# Patient Record
Sex: FEMALE | Race: WHITE | Employment: STUDENT | URBAN - METROPOLITAN AREA
[De-identification: names, ages, dates, MRNs, and addresses within clinical notes are randomized per-mention and may not be internally consistent; named-entity substitution may affect disease eponyms.]

---

## 2017-01-21 ENCOUNTER — OFFICE VISIT (OUTPATIENT)
Dept: FAMILY MEDICINE CLINIC | Facility: CLINIC | Age: 22
End: 2017-01-21

## 2017-01-21 VITALS
HEART RATE: 61 BPM | HEIGHT: 63 IN | RESPIRATION RATE: 16 BRPM | WEIGHT: 135 LBS | OXYGEN SATURATION: 99 % | BODY MASS INDEX: 23.92 KG/M2

## 2017-01-21 DIAGNOSIS — J02.9 SORE THROAT: Primary | ICD-10-CM

## 2017-01-21 LAB
CONTROL LINE PRESENT WITH A CLEAR BACKGROUND (YES/NO): YES YES/NO
CONTROL LINE PRESENT WITH A CLEAR BACKGROUND (YES/NO): YES YES/NO
MONONUCLEOSIS TEST, QUAL: NEGATIVE
STREP GRP A CUL-SCR: NEGATIVE

## 2017-01-21 PROCEDURE — 87880 STREP A ASSAY W/OPTIC: CPT | Performed by: PHYSICIAN ASSISTANT

## 2017-01-21 PROCEDURE — 86308 HETEROPHILE ANTIBODY SCREEN: CPT | Performed by: PHYSICIAN ASSISTANT

## 2017-01-21 PROCEDURE — 99213 OFFICE O/P EST LOW 20 MIN: CPT | Performed by: PHYSICIAN ASSISTANT

## 2017-01-21 PROCEDURE — 87081 CULTURE SCREEN ONLY: CPT | Performed by: PHYSICIAN ASSISTANT

## 2017-01-21 RX ORDER — CODEINE PHOSPHATE AND GUAIFENESIN 10; 100 MG/5ML; MG/5ML
10 SOLUTION ORAL EVERY 6 HOURS PRN
Qty: 120 ML | Refills: 0 | Status: SHIPPED | OUTPATIENT
Start: 2017-01-21 | End: 2017-01-31

## 2017-01-21 RX ORDER — FLUTICASONE PROPIONATE 50 MCG
2 SPRAY, SUSPENSION (ML) NASAL DAILY
Qty: 16 G | Refills: 0 | Status: SHIPPED | OUTPATIENT
Start: 2017-01-21 | End: 2017-01-26

## 2017-01-21 NOTE — PROGRESS NOTES
CHIEF COMPLAINT:   Patient presents with:  Sore Throat: s/s for 4 days  Ear Pain: Left/Right ears        HPI:   Minus Neil is a 24year old female presents to clinic with complaint of sore throat with radiation to bilateral ears.  Patient has had for 4 GENERAL: well developed, well nourished,in no apparent distress. Non toxic.    SKIN: no rashes,no suspicious lesions  HEAD: atraumatic, normocephalic  EYES: conjunctiva clear, EOM intact  EARS: TM's clear, non-injected, no bulging, retraction, or fluid  NOS Comfort measures explained and discussed as listed in Patient Instructions  Ibuprofen q 6 hours for pain  Chloraseptic spray  Oral Lozenges  Cheratussin at night for cough. Flonase for PND 2 sprays each nostril daily.     Follow up in 3-5 days if not impr · Over-the-counter cold medicines will not shorten the length of time you’re sick, but they may be helpful for the following symptoms: cough, sore throat, and nasal and sinus congestion.  (Note: Do not use decongestants if you have high blood pressure.)  Fo The tonsils and pharynx can become inflamed due to a cold or flu virus. Postnasal drip (excess mucus draining from the nasal cavity) can irritate the throat. It can also make the throat or tonsils more likely to be infected by bacteria.  Severe, untreated t Treatment depends on many factors. What is the likely cause? Is the problem recent? Does it keep coming back? In many cases, the best thing to do is to treat the symptoms, rest, and let the problem heal itself.  Antibiotics may help clear up some bacterial In some cases, tonsils need to be removed. This is often done as outpatient (same-day) surgery. Your healthcare provider may advise removing the tonsils in cases of:  · Several severe bouts of tonsillitis in a year.  “Severe” episodes include those that radha

## 2017-01-23 ENCOUNTER — OFFICE VISIT (OUTPATIENT)
Dept: FAMILY MEDICINE CLINIC | Facility: CLINIC | Age: 22
End: 2017-01-23

## 2017-01-23 VITALS
TEMPERATURE: 98 F | BODY MASS INDEX: 23.57 KG/M2 | RESPIRATION RATE: 16 BRPM | HEIGHT: 63 IN | DIASTOLIC BLOOD PRESSURE: 70 MMHG | HEART RATE: 104 BPM | SYSTOLIC BLOOD PRESSURE: 108 MMHG | WEIGHT: 133 LBS

## 2017-01-23 DIAGNOSIS — J02.9 SORE THROAT: Primary | ICD-10-CM

## 2017-01-23 DIAGNOSIS — J03.90 TONSILLITIS: ICD-10-CM

## 2017-01-23 LAB — CONTROL LINE PRESENT WITH A CLEAR BACKGROUND (YES/NO): YES YES/NO

## 2017-01-23 PROCEDURE — 87880 STREP A ASSAY W/OPTIC: CPT | Performed by: FAMILY MEDICINE

## 2017-01-23 PROCEDURE — 99213 OFFICE O/P EST LOW 20 MIN: CPT | Performed by: FAMILY MEDICINE

## 2017-01-23 RX ORDER — AMOXICILLIN AND CLAVULANATE POTASSIUM 875; 125 MG/1; MG/1
1 TABLET, FILM COATED ORAL 2 TIMES DAILY
Qty: 20 TABLET | Refills: 0 | Status: SHIPPED | OUTPATIENT
Start: 2017-01-23 | End: 2017-02-02

## 2017-01-26 ENCOUNTER — OFFICE VISIT (OUTPATIENT)
Dept: FAMILY MEDICINE CLINIC | Facility: CLINIC | Age: 22
End: 2017-01-26

## 2017-01-26 VITALS
DIASTOLIC BLOOD PRESSURE: 74 MMHG | HEIGHT: 63 IN | SYSTOLIC BLOOD PRESSURE: 118 MMHG | HEART RATE: 68 BPM | RESPIRATION RATE: 16 BRPM | BODY MASS INDEX: 23.04 KG/M2 | WEIGHT: 130 LBS | TEMPERATURE: 98 F

## 2017-01-26 DIAGNOSIS — J02.9 SORE THROAT: Primary | ICD-10-CM

## 2017-01-26 DIAGNOSIS — J03.90 TONSILLITIS: ICD-10-CM

## 2017-01-26 PROCEDURE — 99213 OFFICE O/P EST LOW 20 MIN: CPT | Performed by: PHYSICIAN ASSISTANT

## 2017-01-26 RX ORDER — METHYLPREDNISOLONE 4 MG/1
TABLET ORAL
Qty: 1 KIT | Refills: 0 | Status: SHIPPED | OUTPATIENT
Start: 2017-01-26 | End: 2017-05-23 | Stop reason: ALTCHOICE

## 2017-01-26 NOTE — PROGRESS NOTES
HPI:   Kevin Tyler is a 24year old female who presents for sore throat for  1  weeks. Patient was seen at the [facility] 1/21 and at our clinic with YP 1/23. Patient had negative strep and mono at the [facility]; also had negative throat culture.  Was prescribed flona distress  SKIN: warm & dry, no rash  EYES:PERRLA, conjunctiva are clear  HEENT: atraumatic, normocephalic, TMs clear and normal, nares patent, posterior pharynx with mild erythema, cobblestoning and PND, tonsils are 2+, uvula midline, no trismus, no drooli

## 2017-02-08 NOTE — PROGRESS NOTES
HPI:    Patient ID: Luis Magdaleno is a 24year old female. Sore Throat   This is a new problem. Episode onset: 1 wk before visit. The problem has been gradually worsening. There has been no fever. The pain is moderate.  Associated symptoms include swolle diagnosis)  Tonsillitis   Strep test negative. Worsening sore throat. With large exudative tonsills. + chills  Follow up if worse or not resolving in a few days. 1. Sore throat  - Rapid Strep  - Amoxicillin-Pot Clavulanate 875-125 MG Oral Tab;  Take 1 t

## 2017-05-23 ENCOUNTER — OFFICE VISIT (OUTPATIENT)
Dept: FAMILY MEDICINE CLINIC | Facility: CLINIC | Age: 22
End: 2017-05-23

## 2017-05-23 VITALS
BODY MASS INDEX: 23.39 KG/M2 | TEMPERATURE: 99 F | SYSTOLIC BLOOD PRESSURE: 106 MMHG | RESPIRATION RATE: 20 BRPM | HEIGHT: 63 IN | WEIGHT: 132 LBS | HEART RATE: 63 BPM | DIASTOLIC BLOOD PRESSURE: 70 MMHG | OXYGEN SATURATION: 98 %

## 2017-05-23 DIAGNOSIS — R10.12 LEFT UPPER QUADRANT PAIN: Primary | ICD-10-CM

## 2017-05-23 PROCEDURE — 87086 URINE CULTURE/COLONY COUNT: CPT | Performed by: INTERNAL MEDICINE

## 2017-05-23 PROCEDURE — 81003 URINALYSIS AUTO W/O SCOPE: CPT | Performed by: INTERNAL MEDICINE

## 2017-05-23 PROCEDURE — 99214 OFFICE O/P EST MOD 30 MIN: CPT | Performed by: INTERNAL MEDICINE

## 2017-05-23 RX ORDER — SUCRALFATE 1 G/1
1 TABLET ORAL
Qty: 40 TABLET | Refills: 0 | Status: SHIPPED | OUTPATIENT
Start: 2017-05-23 | End: 2017-06-02

## 2017-05-23 NOTE — PATIENT INSTRUCTIONS
LAB HOURS & LOCATIONS        [facility])    [address] [facility]   [address] [place] [address]     ([address])         [address] [place] [address] Ave    Mon-Fri  5am-8pm     Mon-Fri   7am-4pm  Sat         6am-3pm     Sat          7am-3pm      Laverne Herron

## 2017-05-23 NOTE — PROGRESS NOTES
Nettie Stallworth is a 24year old female who presents with abdominal pain. Pain is located at LUQ. Pain is described as sharp. Severity is moderate, constant. Associated symptoms: nausea. The pain radiates to back and shoulders.   Has had for 2  weeks, but congestion, throat clear   NECK: supple,no adenopathy  LUNGS: clear to auscultation, easy breathing  CV: S1 S2, RRR without murmur  GI: active bowel sounds; no masses; no HSM; LUQ tenderness; no rebound or rigidity; no guarding; no hernia on exam  EXT: no

## 2017-05-24 ENCOUNTER — LAB ENCOUNTER (OUTPATIENT)
Dept: LAB | Age: 22
End: 2017-05-24
Attending: INTERNAL MEDICINE
Payer: COMMERCIAL

## 2017-05-24 DIAGNOSIS — R10.12 ABDOMINAL PAIN, LEFT UPPER QUADRANT: Primary | ICD-10-CM

## 2017-05-24 PROCEDURE — 80053 COMPREHEN METABOLIC PANEL: CPT | Performed by: INTERNAL MEDICINE

## 2017-05-24 PROCEDURE — 36415 COLL VENOUS BLD VENIPUNCTURE: CPT | Performed by: INTERNAL MEDICINE

## 2017-05-24 PROCEDURE — 85025 COMPLETE CBC W/AUTO DIFF WBC: CPT | Performed by: INTERNAL MEDICINE

## 2017-06-01 ENCOUNTER — OFFICE VISIT (OUTPATIENT)
Dept: FAMILY MEDICINE CLINIC | Facility: CLINIC | Age: 22
End: 2017-06-01

## 2017-06-01 ENCOUNTER — HOSPITAL ENCOUNTER (OUTPATIENT)
Dept: ULTRASOUND IMAGING | Age: 22
Discharge: HOME | End: 2017-06-01
Attending: INTERNAL MEDICINE
Payer: COMMERCIAL

## 2017-06-01 ENCOUNTER — APPOINTMENT (OUTPATIENT)
Dept: ULTRASOUND IMAGING | Age: 22
End: 2017-06-01
Attending: INTERNAL MEDICINE
Payer: COMMERCIAL

## 2017-06-01 VITALS
SYSTOLIC BLOOD PRESSURE: 98 MMHG | TEMPERATURE: 98 F | HEART RATE: 69 BPM | HEIGHT: 63 IN | BODY MASS INDEX: 23.39 KG/M2 | WEIGHT: 132 LBS | RESPIRATION RATE: 18 BRPM | DIASTOLIC BLOOD PRESSURE: 64 MMHG | OXYGEN SATURATION: 98 %

## 2017-06-01 DIAGNOSIS — M79.661 RIGHT CALF PAIN: ICD-10-CM

## 2017-06-01 DIAGNOSIS — R10.12 LUQ ABDOMINAL PAIN: Primary | ICD-10-CM

## 2017-06-01 PROCEDURE — 93971 EXTREMITY STUDY: CPT | Performed by: INTERNAL MEDICINE

## 2017-06-01 PROCEDURE — 99214 OFFICE O/P EST MOD 30 MIN: CPT | Performed by: INTERNAL MEDICINE

## 2017-06-01 NOTE — PROGRESS NOTES
Kimberly Stewart is a 24year old female. HPI:   Here for follow up LUQ abdominal pain, radiates to her back. No appetite. Treated 2 weeks ago with Carafate, pt states the first few days it seemed to help but then the pain returned.  Occurs daily, intermitten tenderness with palpation  MS: right calf without edema or erythema; mild tenderness with palpation  EXT: no cyanosis, clubbing or edema    ASSESSMENT AND PLAN:   LUQ abdominal pain  (primary encounter diagnosis)- stat US, referral to GI.  Continue carafate

## 2017-10-20 ENCOUNTER — OFFICE VISIT (OUTPATIENT)
Dept: FAMILY MEDICINE CLINIC | Facility: CLINIC | Age: 22
End: 2017-10-20

## 2017-10-20 VITALS
BODY MASS INDEX: 22.68 KG/M2 | HEART RATE: 60 BPM | WEIGHT: 128 LBS | DIASTOLIC BLOOD PRESSURE: 72 MMHG | SYSTOLIC BLOOD PRESSURE: 118 MMHG | HEIGHT: 63 IN | RESPIRATION RATE: 16 BRPM | TEMPERATURE: 98 F

## 2017-10-20 DIAGNOSIS — Z00.00 ROUTINE MEDICAL EXAM: Primary | ICD-10-CM

## 2017-10-20 PROCEDURE — 99395 PREV VISIT EST AGE 18-39: CPT | Performed by: PHYSICIAN ASSISTANT

## 2017-10-20 RX ORDER — TOPIRAMATE 25 MG/1
TABLET ORAL
Qty: 90 TABLET | Refills: 0 | COMMUNITY
Start: 2017-10-20 | End: 2020-01-27

## 2017-10-20 RX ORDER — OXCARBAZEPINE 300 MG/1
375 TABLET, FILM COATED ORAL 2 TIMES DAILY
Refills: 1 | COMMUNITY
Start: 2017-10-17 | End: 2018-12-17

## 2017-10-20 NOTE — PROGRESS NOTES
HPI:   Kait Giron is a 24year old female who presents for a complete physical exam. Symptoms: denies discharge, itching, burning or dysuria, periods are regular. Patient complains of needing physical. Will be seeing gynecology in 2 weeks.  Pt has form t Cigarettes  Smokeless tobacco: Never Used                      Comment: quit x 2 months   Alcohol use: No              Occ: works at [email or web address] in [employer]. : no. Children: no.   Exercise:  twice per week.   Diet: watches fats closely     REVIEW medical exam  Pt allergic to flu vaccine. Form completed and returned. Has appointment with gynecology in 2 weeks for well woman exam.  Continue follow up with neurology and psychiatry as recommended.      Discussed diet, exercise,calcium, vitamin D, fis

## 2017-11-10 ENCOUNTER — HOSPITAL ENCOUNTER (OUTPATIENT)
Age: 22
Discharge: HOME | End: 2017-11-10
Attending: EMERGENCY MEDICINE
Payer: COMMERCIAL

## 2017-11-10 VITALS
DIASTOLIC BLOOD PRESSURE: 67 MMHG | OXYGEN SATURATION: 99 % | SYSTOLIC BLOOD PRESSURE: 113 MMHG | RESPIRATION RATE: 16 BRPM | TEMPERATURE: 98 F | HEART RATE: 67 BPM

## 2017-11-10 DIAGNOSIS — S16.1XXA STRAIN OF NECK MUSCLE, INITIAL ENCOUNTER: Primary | ICD-10-CM

## 2017-11-10 PROCEDURE — 99213 OFFICE O/P EST LOW 20 MIN: CPT

## 2017-11-10 PROCEDURE — 99214 OFFICE O/P EST MOD 30 MIN: CPT

## 2017-11-10 RX ORDER — CYCLOBENZAPRINE HCL 10 MG
10 TABLET ORAL 3 TIMES DAILY PRN
Qty: 20 TABLET | Refills: 0 | Status: SHIPPED | OUTPATIENT
Start: 2017-11-10 | End: 2017-11-17
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
## 2018-01-31 ENCOUNTER — OFFICE VISIT (OUTPATIENT)
Dept: FAMILY MEDICINE CLINIC | Facility: CLINIC | Age: 23
End: 2018-01-31

## 2018-01-31 VITALS
HEIGHT: 63 IN | RESPIRATION RATE: 18 BRPM | BODY MASS INDEX: 22.68 KG/M2 | OXYGEN SATURATION: 98 % | DIASTOLIC BLOOD PRESSURE: 60 MMHG | HEART RATE: 81 BPM | WEIGHT: 128 LBS | SYSTOLIC BLOOD PRESSURE: 100 MMHG | TEMPERATURE: 98 F

## 2018-01-31 DIAGNOSIS — K59.03 DRUG-INDUCED CONSTIPATION: Primary | ICD-10-CM

## 2018-01-31 PROCEDURE — 99213 OFFICE O/P EST LOW 20 MIN: CPT | Performed by: FAMILY MEDICINE

## 2018-01-31 RX ORDER — DULOXETIN HYDROCHLORIDE 20 MG/1
20 CAPSULE, DELAYED RELEASE ORAL
COMMUNITY
End: 2018-05-13 | Stop reason: ALTCHOICE

## 2018-01-31 RX ORDER — ASPIRIN 81 MG
100 TABLET, DELAYED RELEASE (ENTERIC COATED) ORAL 2 TIMES DAILY
Qty: 20 TABLET | Refills: 0 | COMMUNITY
Start: 2018-01-31 | End: 2018-03-20 | Stop reason: ALTCHOICE

## 2018-01-31 NOTE — PATIENT INSTRUCTIONS
Use colace/docusate 100mg twice a day until passing stools. Then once a day. Even 1/2 tablet a day. Speak to Dr. Rishi Bautista regarding Cymbalta, >10% of patients get constipation and 1-10% get abdominal pain. May need to wean and try an alternative.

## 2018-02-08 ENCOUNTER — TELEPHONE (OUTPATIENT)
Dept: FAMILY MEDICINE CLINIC | Facility: CLINIC | Age: 23
End: 2018-02-08

## 2018-02-08 NOTE — TELEPHONE ENCOUNTER
Pt states she has not had a bowel movement in a week. States she is very uncomfortable. Pt has been taking colace twice daily. Advised trying at home enema or to go to IC. Pt agreed.

## 2018-02-08 NOTE — TELEPHONE ENCOUNTER
Been using Colace for a week. Still have significant    bloating and abdominal pain/no bowel mvmts.         This was received in a MyChart message from patient.   Sending to triage due to significant bloating and abdominal pain note.  (IC?)  Patient has dariel

## 2018-03-20 PROCEDURE — 88175 CYTOPATH C/V AUTO FLUID REDO: CPT | Performed by: OBSTETRICS & GYNECOLOGY

## 2018-05-21 ENCOUNTER — OFFICE VISIT (OUTPATIENT)
Dept: FAMILY MEDICINE CLINIC | Facility: CLINIC | Age: 23
End: 2018-05-21

## 2018-05-21 VITALS
DIASTOLIC BLOOD PRESSURE: 68 MMHG | TEMPERATURE: 98 F | BODY MASS INDEX: 23.04 KG/M2 | SYSTOLIC BLOOD PRESSURE: 98 MMHG | HEART RATE: 60 BPM | RESPIRATION RATE: 14 BRPM | WEIGHT: 130 LBS | HEIGHT: 63 IN

## 2018-05-21 DIAGNOSIS — R59.1 LYMPHADENOPATHY OF HEAD AND NECK: ICD-10-CM

## 2018-05-21 DIAGNOSIS — J03.90 EXUDATIVE TONSILLITIS: Primary | ICD-10-CM

## 2018-05-21 DIAGNOSIS — R05.9 COUGH: ICD-10-CM

## 2018-05-21 PROCEDURE — 99213 OFFICE O/P EST LOW 20 MIN: CPT | Performed by: PHYSICIAN ASSISTANT

## 2018-05-21 RX ORDER — ALBUTEROL SULFATE 90 UG/1
2 AEROSOL, METERED RESPIRATORY (INHALATION) EVERY 6 HOURS PRN
Qty: 1 INHALER | Refills: 0 | Status: SHIPPED | OUTPATIENT
Start: 2018-05-21 | End: 2018-08-14

## 2018-05-21 NOTE — PROGRESS NOTES
HPI:   Isauro Samaniego is a 25year old female with seizure disorder who presents for follow up immediate care. Pt seen on 5/6 and again 5/13.  On 5/6, pt treated for strep throat, rapid strep was negative; she was treated with amoxicillin 500 mg TID for 10 d denies wheezing  CV: denies chest pain on exertion  GI: denies nausea, no abdominal pain  NEURO: denies headaches, denies dizziness    EXAM:   BP 98/68   Pulse 60   Temp 97.5 °F (36.4 °C)   Resp 14   Ht 63\"   Wt 130 lb   LMP 04/30/2018 (Approximate)   Ethel

## 2018-06-22 ENCOUNTER — LAB REQUISITION (OUTPATIENT)
Dept: LAB | Facility: HOSPITAL | Age: 23
End: 2018-06-22
Payer: COMMERCIAL

## 2018-06-22 DIAGNOSIS — J35.01 CHRONIC TONSILLITIS: ICD-10-CM

## 2018-06-22 PROCEDURE — 88304 TISSUE EXAM BY PATHOLOGIST: CPT | Performed by: OTOLARYNGOLOGY

## 2018-08-14 PROCEDURE — 87660 TRICHOMONAS VAGIN DIR PROBE: CPT | Performed by: OBSTETRICS & GYNECOLOGY

## 2018-08-14 PROCEDURE — 87480 CANDIDA DNA DIR PROBE: CPT | Performed by: OBSTETRICS & GYNECOLOGY

## 2018-08-14 PROCEDURE — 87510 GARDNER VAG DNA DIR PROBE: CPT | Performed by: OBSTETRICS & GYNECOLOGY

## 2018-12-13 ENCOUNTER — OFFICE VISIT (OUTPATIENT)
Dept: FAMILY MEDICINE CLINIC | Facility: CLINIC | Age: 23
End: 2018-12-13
Payer: COMMERCIAL

## 2018-12-13 VITALS
HEART RATE: 89 BPM | BODY MASS INDEX: 21.91 KG/M2 | HEIGHT: 63 IN | SYSTOLIC BLOOD PRESSURE: 105 MMHG | WEIGHT: 123.63 LBS | TEMPERATURE: 98 F | RESPIRATION RATE: 16 BRPM | DIASTOLIC BLOOD PRESSURE: 70 MMHG | OXYGEN SATURATION: 98 %

## 2018-12-13 DIAGNOSIS — B97.89 VIRAL SINUSITIS: Primary | ICD-10-CM

## 2018-12-13 DIAGNOSIS — J32.9 VIRAL SINUSITIS: Primary | ICD-10-CM

## 2018-12-13 PROCEDURE — 99213 OFFICE O/P EST LOW 20 MIN: CPT | Performed by: PHYSICIAN ASSISTANT

## 2018-12-13 RX ORDER — DESVENLAFAXINE 50 MG/1
TABLET, EXTENDED RELEASE ORAL
Refills: 1 | COMMUNITY
Start: 2018-10-24 | End: 2019-06-17

## 2018-12-13 NOTE — PROGRESS NOTES
CHIEF COMPLAINT:   Patient presents with:  URI: nasal congestion,headache sx x 4-5 days. HPI:   Aman Martínez is a 21year old female who presents for URI sxs for  4 days.   Patient reports temp of 100 yesterday-none today, chills sweats, nasal congest EYES: conjunctiva clear, EOM intact  EARS: TM's not erythematous, no bulging, no retraction, no fluid, bony landmarks intact. EACs WNL BL.     NOSE: Nostrils patent, + nasal discharge, nasal mucosa inflamed   THROAT: oral mucosa pink, moist. Posterior phar Home care  · Drink plenty of water, hot tea, and other liquids. This may help thin nasal mucus. It also may help your sinuses drain fluids. · Heat may help soothe painful areas of your face. Use a towel soaked in hot water.  Or,  the shower and dir · Facial pain or headache that gets worse  · Stiff neck  · Unusual drowsiness or confusion  · Swelling of your forehead or eyelids  · Vision problems, such as blurred or double vision  · Fever of 100.4ºF (38ºC) or higher, or as directed by your healthcare

## 2018-12-17 ENCOUNTER — OFFICE VISIT (OUTPATIENT)
Dept: FAMILY MEDICINE CLINIC | Facility: CLINIC | Age: 23
End: 2018-12-17
Payer: COMMERCIAL

## 2018-12-17 VITALS
RESPIRATION RATE: 18 BRPM | BODY MASS INDEX: 21.97 KG/M2 | OXYGEN SATURATION: 97 % | TEMPERATURE: 99 F | WEIGHT: 124 LBS | HEIGHT: 63 IN | DIASTOLIC BLOOD PRESSURE: 65 MMHG | SYSTOLIC BLOOD PRESSURE: 104 MMHG | HEART RATE: 63 BPM

## 2018-12-17 DIAGNOSIS — J01.00 ACUTE MAXILLARY SINUSITIS, RECURRENCE NOT SPECIFIED: Primary | ICD-10-CM

## 2018-12-17 PROCEDURE — 99213 OFFICE O/P EST LOW 20 MIN: CPT | Performed by: PHYSICIAN ASSISTANT

## 2018-12-17 RX ORDER — OXCARBAZEPINE 150 MG/1
TABLET, FILM COATED ORAL
Refills: 0 | COMMUNITY
Start: 2018-08-03 | End: 2019-02-21

## 2018-12-17 RX ORDER — AMOXICILLIN AND CLAVULANATE POTASSIUM 875; 125 MG/1; MG/1
1 TABLET, FILM COATED ORAL 2 TIMES DAILY
Qty: 20 TABLET | Refills: 0 | Status: SHIPPED | OUTPATIENT
Start: 2018-12-17 | End: 2018-12-27

## 2018-12-17 NOTE — PROGRESS NOTES
HPI:   Minus Neil is a 21year old female who presents for sinus symptoms for  1  weeks. Patient reports congestion, dry cough, sinus pain, Tmax of 100.0 on Wednesday. +ear ache. +lightheaded and fatigued.   Patient is taking mucinex and nyquil, no medic bilaterally but no erythema, nares patent, +mucosal edema and erythema, posterior pharynx with mild erythema and cobblestoning, +right frontal and bilateral maxillary sinus tenderness  NECK: supple,no adenopathy  LUNGS: CTA, easy breathing, no cough  CV: n

## 2019-02-21 ENCOUNTER — OFFICE VISIT (OUTPATIENT)
Dept: FAMILY MEDICINE CLINIC | Facility: CLINIC | Age: 24
End: 2019-02-21
Payer: COMMERCIAL

## 2019-02-21 VITALS
HEIGHT: 63 IN | OXYGEN SATURATION: 98 % | RESPIRATION RATE: 18 BRPM | TEMPERATURE: 98 F | DIASTOLIC BLOOD PRESSURE: 60 MMHG | HEART RATE: 98 BPM | BODY MASS INDEX: 21.62 KG/M2 | SYSTOLIC BLOOD PRESSURE: 108 MMHG | WEIGHT: 122 LBS

## 2019-02-21 DIAGNOSIS — J01.00 ACUTE MAXILLARY SINUSITIS, RECURRENCE NOT SPECIFIED: Primary | ICD-10-CM

## 2019-02-21 DIAGNOSIS — J02.9 SORE THROAT: ICD-10-CM

## 2019-02-21 PROCEDURE — 99213 OFFICE O/P EST LOW 20 MIN: CPT | Performed by: PHYSICIAN ASSISTANT

## 2019-02-21 RX ORDER — OXCARBAZEPINE 150 MG/1
450 TABLET, FILM COATED ORAL 2 TIMES DAILY
Qty: 90 TABLET | Refills: 0 | COMMUNITY
Start: 2019-02-21 | End: 2019-05-21 | Stop reason: ALTCHOICE

## 2019-02-21 RX ORDER — VALACYCLOVIR HYDROCHLORIDE 1 G/1
TABLET, FILM COATED ORAL
Refills: 1 | COMMUNITY
Start: 2019-01-22 | End: 2019-02-21

## 2019-02-21 RX ORDER — CEFDINIR 300 MG/1
300 CAPSULE ORAL 2 TIMES DAILY
Qty: 20 CAPSULE | Refills: 0 | Status: SHIPPED | OUTPATIENT
Start: 2019-02-21 | End: 2019-03-04

## 2019-02-21 NOTE — PROGRESS NOTES
HPI:   Rachael Blackwood is a 21year old female who presents for sinus symptoms for  6  days. Patient reports sore throat, congestion, green colored nasal discharge, cough with green colored sputum, sinus pain, denies wheezing, hoarse voice, denies fever.   Ta conjunctiva are clear  HEENT: atraumatic, normocephalic, TMs pearly gray without erythema or effusion, nares patent, +mucosal edema and erythema, +clear nasal discharge, posterior pharynx , +left maxillary sinus tenderness  NECK: supple,no adenopathy  LUNG

## 2019-02-26 ENCOUNTER — PATIENT MESSAGE (OUTPATIENT)
Dept: FAMILY MEDICINE CLINIC | Facility: CLINIC | Age: 24
End: 2019-02-26

## 2019-02-26 NOTE — TELEPHONE ENCOUNTER
From: Denys Duran  To: Thomes [place], [place]  Sent: 2/26/2019 2:38 PM CST  Subject: Visit Follow-up Question    Roosevelt Chavarria,    I saw you last Thursday for some throat/cold issues and you said to follow-up if I wasn't feeling better by Monday.  I've been miguel

## 2019-03-01 ENCOUNTER — PATIENT MESSAGE (OUTPATIENT)
Dept: FAMILY MEDICINE CLINIC | Facility: CLINIC | Age: 24
End: 2019-03-01

## 2019-03-01 NOTE — TELEPHONE ENCOUNTER
From: Luis Nova  To: Parisa Marina  Sent: 3/1/2019 11:25 AM CST  Subject: Visit Follow-up Question    Hello-    I am still experiencing all of the symptoms that I had last week, as well as the same from earlier this week.  I had sent a follow-up

## 2019-03-04 ENCOUNTER — OFFICE VISIT (OUTPATIENT)
Dept: FAMILY MEDICINE CLINIC | Facility: CLINIC | Age: 24
End: 2019-03-04
Payer: COMMERCIAL

## 2019-03-04 VITALS
WEIGHT: 122 LBS | DIASTOLIC BLOOD PRESSURE: 60 MMHG | SYSTOLIC BLOOD PRESSURE: 102 MMHG | HEIGHT: 63 IN | HEART RATE: 74 BPM | TEMPERATURE: 97 F | RESPIRATION RATE: 20 BRPM | OXYGEN SATURATION: 98 % | BODY MASS INDEX: 21.62 KG/M2

## 2019-03-04 DIAGNOSIS — J01.01 ACUTE RECURRENT MAXILLARY SINUSITIS: Primary | ICD-10-CM

## 2019-03-04 PROCEDURE — 99213 OFFICE O/P EST LOW 20 MIN: CPT | Performed by: INTERNAL MEDICINE

## 2019-03-04 RX ORDER — PREDNISONE 20 MG/1
TABLET ORAL
Qty: 8 TABLET | Refills: 0 | Status: SHIPPED | OUTPATIENT
Start: 2019-03-04 | End: 2019-05-06

## 2019-03-04 RX ORDER — AMOXICILLIN AND CLAVULANATE POTASSIUM 875; 125 MG/1; MG/1
1 TABLET, FILM COATED ORAL 2 TIMES DAILY
Qty: 20 TABLET | Refills: 0 | Status: SHIPPED | OUTPATIENT
Start: 2019-03-04 | End: 2019-03-14

## 2019-03-04 NOTE — PROGRESS NOTES
HPI:   Angelica Engel is a 21year old female who presents for sinus symptoms for  1  months. Patient reports congestion, green-cream colored nasal discharge, cough with green colored sputum, cough is not keeping pt up at night, sinus pain.   Treated for sin turbinates, posterior pharynx clear, + left maxillary sinus tenderness  NECK: supple,no adenopathy  LUNGS: CTA, easy breathing, no cough  CV: normal S1S2, RRR without murmur     ASSESSMENT AND PLAN:   Acute Maxillary Sinusitis    Augmentin 875mg 1 tab po B

## 2019-05-06 ENCOUNTER — OFFICE VISIT (OUTPATIENT)
Dept: FAMILY MEDICINE CLINIC | Facility: CLINIC | Age: 24
End: 2019-05-06
Payer: COMMERCIAL

## 2019-05-06 VITALS
WEIGHT: 122 LBS | BODY MASS INDEX: 21.62 KG/M2 | DIASTOLIC BLOOD PRESSURE: 62 MMHG | OXYGEN SATURATION: 98 % | HEART RATE: 88 BPM | SYSTOLIC BLOOD PRESSURE: 108 MMHG | HEIGHT: 63 IN | TEMPERATURE: 98 F | RESPIRATION RATE: 20 BRPM

## 2019-05-06 DIAGNOSIS — J32.9 FREQUENT SINUS INFECTIONS: ICD-10-CM

## 2019-05-06 DIAGNOSIS — J02.9 PHARYNGITIS, UNSPECIFIED ETIOLOGY: Primary | ICD-10-CM

## 2019-05-06 PROCEDURE — 87880 STREP A ASSAY W/OPTIC: CPT | Performed by: INTERNAL MEDICINE

## 2019-05-06 PROCEDURE — 99213 OFFICE O/P EST LOW 20 MIN: CPT | Performed by: INTERNAL MEDICINE

## 2019-05-06 RX ORDER — AZELASTINE HYDROCHLORIDE, FLUTICASONE PROPIONATE 137; 50 UG/1; UG/1
1 SPRAY, METERED NASAL
Qty: 1 BOTTLE | Refills: 1 | Status: SHIPPED | OUTPATIENT
Start: 2019-05-06 | End: 2019-06-17

## 2019-05-06 RX ORDER — CLARITHROMYCIN 500 MG/1
500 TABLET, COATED ORAL 2 TIMES DAILY WITH MEALS
Qty: 28 TABLET | Refills: 0 | Status: SHIPPED | OUTPATIENT
Start: 2019-05-06 | End: 2019-05-20

## 2019-05-06 NOTE — PROGRESS NOTES
HPI:   Rabia Ellison is a 21year old female who presents for sinus symptoms on & off since she had her tonsils removed last June. Recently these symptoms have been for  2  weeks.  Patient reports sore throat, congestion, dark thick green colored nasal dis Resp 20   Ht 63\"   Wt 122 lb   LMP 04/11/2019   SpO2 98%   BMI 21.61 kg/m²   GENERAL: well developed and in no apparent distress  SKIN: warm & dry, no rash  EYES:PERRLA, conjunctiva are clear  HEENT: atraumatic, normocephalic, TMs dull, nares congested, b

## 2019-06-17 ENCOUNTER — OFFICE VISIT (OUTPATIENT)
Dept: FAMILY MEDICINE CLINIC | Facility: CLINIC | Age: 24
End: 2019-06-17
Payer: COMMERCIAL

## 2019-06-17 ENCOUNTER — HOSPITAL ENCOUNTER (EMERGENCY)
Facility: HOSPITAL | Age: 24
Discharge: HOME | End: 2019-06-17
Attending: EMERGENCY MEDICINE
Payer: COMMERCIAL

## 2019-06-17 ENCOUNTER — APPOINTMENT (OUTPATIENT)
Dept: CT IMAGING | Facility: HOSPITAL | Age: 24
End: 2019-06-17
Attending: EMERGENCY MEDICINE
Payer: COMMERCIAL

## 2019-06-17 VITALS
TEMPERATURE: 97 F | WEIGHT: 118 LBS | SYSTOLIC BLOOD PRESSURE: 102 MMHG | OXYGEN SATURATION: 100 % | HEART RATE: 52 BPM | RESPIRATION RATE: 18 BRPM | BODY MASS INDEX: 21 KG/M2 | DIASTOLIC BLOOD PRESSURE: 68 MMHG

## 2019-06-17 VITALS
DIASTOLIC BLOOD PRESSURE: 62 MMHG | RESPIRATION RATE: 18 BRPM | SYSTOLIC BLOOD PRESSURE: 100 MMHG | HEART RATE: 80 BPM | WEIGHT: 119 LBS | HEIGHT: 63 IN | OXYGEN SATURATION: 97 % | BODY MASS INDEX: 21.09 KG/M2 | TEMPERATURE: 97 F

## 2019-06-17 DIAGNOSIS — R10.11 RUQ PAIN: Primary | ICD-10-CM

## 2019-06-17 DIAGNOSIS — R10.2 PELVIC PAIN: ICD-10-CM

## 2019-06-17 DIAGNOSIS — R09.1 PLEURISY: Primary | ICD-10-CM

## 2019-06-17 PROCEDURE — 81025 URINE PREGNANCY TEST: CPT

## 2019-06-17 PROCEDURE — 87086 URINE CULTURE/COLONY COUNT: CPT | Performed by: EMERGENCY MEDICINE

## 2019-06-17 PROCEDURE — 71275 CT ANGIOGRAPHY CHEST: CPT | Performed by: EMERGENCY MEDICINE

## 2019-06-17 PROCEDURE — 81001 URINALYSIS AUTO W/SCOPE: CPT | Performed by: EMERGENCY MEDICINE

## 2019-06-17 PROCEDURE — 83690 ASSAY OF LIPASE: CPT | Performed by: EMERGENCY MEDICINE

## 2019-06-17 PROCEDURE — 99213 OFFICE O/P EST LOW 20 MIN: CPT | Performed by: INTERNAL MEDICINE

## 2019-06-17 PROCEDURE — 96360 HYDRATION IV INFUSION INIT: CPT

## 2019-06-17 PROCEDURE — 99284 EMERGENCY DEPT VISIT MOD MDM: CPT

## 2019-06-17 PROCEDURE — 80053 COMPREHEN METABOLIC PANEL: CPT | Performed by: EMERGENCY MEDICINE

## 2019-06-17 PROCEDURE — 85025 COMPLETE CBC W/AUTO DIFF WBC: CPT | Performed by: EMERGENCY MEDICINE

## 2019-06-17 PROCEDURE — 85378 FIBRIN DEGRADE SEMIQUANT: CPT | Performed by: EMERGENCY MEDICINE

## 2019-06-17 RX ORDER — ONDANSETRON 4 MG/1
4 TABLET, ORALLY DISINTEGRATING ORAL EVERY 4 HOURS PRN
Qty: 10 TABLET | Refills: 0 | Status: SHIPPED | OUTPATIENT
Start: 2019-06-17 | End: 2019-06-24

## 2019-06-17 RX ORDER — NAPROXEN 500 MG/1
500 TABLET ORAL 2 TIMES DAILY PRN
Qty: 20 TABLET | Refills: 0 | Status: SHIPPED | OUTPATIENT
Start: 2019-06-17 | End: 2019-06-24

## 2019-06-18 NOTE — ED PROVIDER NOTES
Patient Seen in: [facility] Emergency Department    History   Patient presents with:  Abdomen/Flank Pain (GI/)  Fever (infectious)  Nausea/Vomiting/Diarrhea (gastrointestinal)    Stated Complaint: right flank pain since thursday    HPI    Patient 52   Temp 97.2 °F (36.2 °C) (Temporal)   Resp 14   Wt 53.5 kg   LMP 06/05/2019   SpO2 100%   BMI 20.90 kg/m²         Physical Exam   Constitutional: She is oriented to person, place, and time. She appears well-developed and well-nourished.    HENT:   Head: Units.     In non-pregnant females:  D-Dimer results of less than 0.5 ug/mL (FEU) have been shown to contribute to  the exclusion of venous thrombolism with a negative predictive value of  approximately 95% when results are used as part of the total clinica fevers and chills. Those symptoms have resolved but now she has a pleuritic chest pain. Overall suspicious for pleurisy. We will add a d-dimer to rule out PE. In addition, patient reports some periumbilical abdominal pain for the last couple of days.

## 2019-06-18 NOTE — PROGRESS NOTES
Nettie Stallworth is a 21year old female. HPI:   Here with RUQ pain and bilateral pelvic pain for less than 1 week. RUQ pain radiates to the right back, 10 out of 10 pain with deep inspiration. No cough. No SOB.   Denies smoking, denies OCP use though she wa headaches, denies dizziness; denies numbness or tingling    EXAM:   /62   Pulse 80   Temp 97.1 °F (36.2 °C) (Other)   Resp 18   Ht 63\"   Wt 119 lb   LMP 06/05/2019   SpO2 97%   BMI 21.08 kg/m²   GENERAL: well developed female in no apparent distress

## 2019-06-20 ENCOUNTER — HOSPITAL ENCOUNTER (OUTPATIENT)
Dept: CT IMAGING | Age: 24
Discharge: HOME | End: 2019-06-20
Attending: INTERNAL MEDICINE
Payer: COMMERCIAL

## 2019-06-20 ENCOUNTER — OFFICE VISIT (OUTPATIENT)
Dept: FAMILY MEDICINE CLINIC | Facility: CLINIC | Age: 24
End: 2019-06-20
Payer: COMMERCIAL

## 2019-06-20 ENCOUNTER — ANCILLARY ORDERS (OUTPATIENT)
Dept: FAMILY MEDICINE CLINIC | Facility: CLINIC | Age: 24
End: 2019-06-20

## 2019-06-20 VITALS
HEIGHT: 63 IN | SYSTOLIC BLOOD PRESSURE: 118 MMHG | HEART RATE: 83 BPM | OXYGEN SATURATION: 98 % | RESPIRATION RATE: 20 BRPM | WEIGHT: 118 LBS | BODY MASS INDEX: 20.91 KG/M2 | TEMPERATURE: 98 F | DIASTOLIC BLOOD PRESSURE: 78 MMHG

## 2019-06-20 DIAGNOSIS — G89.29 CHRONIC RLQ PAIN: ICD-10-CM

## 2019-06-20 DIAGNOSIS — R10.2 PELVIC PAIN: Primary | ICD-10-CM

## 2019-06-20 DIAGNOSIS — R10.11 RUQ PAIN: ICD-10-CM

## 2019-06-20 DIAGNOSIS — R10.2 PELVIC PAIN: ICD-10-CM

## 2019-06-20 DIAGNOSIS — R10.31 CHRONIC RLQ PAIN: ICD-10-CM

## 2019-06-20 PROCEDURE — 74177 CT ABD & PELVIS W/CONTRAST: CPT | Performed by: INTERNAL MEDICINE

## 2019-06-20 PROCEDURE — 99214 OFFICE O/P EST MOD 30 MIN: CPT | Performed by: INTERNAL MEDICINE

## 2019-06-20 PROCEDURE — 87591 N.GONORRHOEAE DNA AMP PROB: CPT | Performed by: INTERNAL MEDICINE

## 2019-06-20 PROCEDURE — 87491 CHLMYD TRACH DNA AMP PROBE: CPT | Performed by: INTERNAL MEDICINE

## 2019-06-20 NOTE — PROGRESS NOTES
Aman Martínez is a 21year old female. HPI:   Here for follow up pelvic and RUQ pain. Pt seen on Monday, after c/o fevers and severe 10/10 RUQ pain with inspiration and tenderness to the entire lower pelvic region, she was sent to the ER.   D-dimer was el due to no appetite and food makes her pain worse; stools 1-2 times daily and very soft, light brown  : denies dysuria; denies vaginal discharge or odor; + pelvic pain as above both sides   NEURO: denies headaches    EXAM:   /78   Pulse 83   Temp 98

## 2019-06-21 ENCOUNTER — TELEPHONE (OUTPATIENT)
Dept: FAMILY MEDICINE CLINIC | Facility: CLINIC | Age: 24
End: 2019-06-21

## 2019-06-21 RX ORDER — DOXYCYCLINE HYCLATE 100 MG/1
100 CAPSULE ORAL 2 TIMES DAILY
Qty: 14 CAPSULE | Refills: 0 | Status: SHIPPED | OUTPATIENT
Start: 2019-06-21 | End: 2019-06-28

## 2019-06-21 NOTE — TELEPHONE ENCOUNTER
Please contact patient and have her start tx with Doxycycline 100 mg BID x 7 days. Rx sent. Avoid sexual contact while on medication. Partner needs to be treated.

## 2019-06-22 RX ORDER — AZITHROMYCIN 500 MG/1
1000 TABLET, FILM COATED ORAL ONCE
Qty: 2 TABLET | Refills: 0 | Status: SHIPPED | OUTPATIENT
Start: 2019-06-22 | End: 2019-06-22

## 2019-06-25 ENCOUNTER — TELEPHONE (OUTPATIENT)
Dept: FAMILY MEDICINE CLINIC | Facility: CLINIC | Age: 24
End: 2019-06-25

## 2019-08-18 ENCOUNTER — OFFICE VISIT (OUTPATIENT)
Dept: FAMILY MEDICINE CLINIC | Facility: CLINIC | Age: 24
End: 2019-08-18
Payer: COMMERCIAL

## 2019-08-18 VITALS
OXYGEN SATURATION: 99 % | SYSTOLIC BLOOD PRESSURE: 94 MMHG | HEART RATE: 82 BPM | HEIGHT: 63 IN | RESPIRATION RATE: 18 BRPM | WEIGHT: 120 LBS | BODY MASS INDEX: 21.26 KG/M2 | TEMPERATURE: 97 F | DIASTOLIC BLOOD PRESSURE: 60 MMHG

## 2019-08-18 DIAGNOSIS — R60.9 SWELLING: ICD-10-CM

## 2019-08-18 DIAGNOSIS — T63.444A BEE STING, UNDETERMINED INTENT, INITIAL ENCOUNTER: Primary | ICD-10-CM

## 2019-08-18 PROCEDURE — 99213 OFFICE O/P EST LOW 20 MIN: CPT | Performed by: NURSE PRACTITIONER

## 2019-08-18 RX ORDER — PREDNISONE 20 MG/1
60 TABLET ORAL DAILY
Qty: 3 TABLET | Refills: 0 | Status: SHIPPED | OUTPATIENT
Start: 2019-08-18 | End: 2019-08-19

## 2019-08-18 NOTE — PATIENT INSTRUCTIONS
Local Reaction to an Insect Sting   You have been stung or bitten by an insect. The insect’s venom or body fluid is causing your skin to react in the area where you were stung or bitten. This often causes redness, itching and swelling.  This reaction will · Diphenhydramine is an oral antihistamine available at drugstores and groceries. Unless a prescription antihistamine was given, you can use this medicine to reduce itching if large areas of the skin are involved.  The medicine may make you sleepy, so be ca · If you are stung by a honeybee, a stinger will remain in your skin. Wasps, yellow jackets, and hornets don’t leave a stinger behind. Move away from the nest area right away.  The stinger of a honeybee releases a substance that will attract other bees to y · Seizure  When to seek medical advice  Call your healthcare provider right away if any of these occur:  · Spreading areas of itching, redness or swelling  · New or worse swelling in the face, eyelids, or  lips  · Dizziness or weakness  Also call your prov

## 2019-08-18 NOTE — PROGRESS NOTES
CHIEF COMPLAINT:   Patient presents with:  Bite Sting,Insect (integumentary): rt ankle  redness and swollen x 2 days       HPI:     Viva Seip is a 21year old female who presents with concerns of bee sting. Patient first noticed symptoms 2 days ago.   R NEURO: no abnormal sensation, no tingling of the skin or numbness.     EXAM:   BP 94/60 (BP Location: Right arm, Patient Position: Sitting, Cuff Size: adult)   Pulse 82   Temp 96.6 °F (35.9 °C) (Tympanic)   Resp 18   Ht 63\"   Wt 120 lb   LMP 08/03/2019 (Ex You have been stung or bitten by an insect. The insect’s venom or body fluid is causing your skin to react in the area where you were stung or bitten. This often causes redness, itching and swelling.  This reaction will fade over a few hours, but it can las · Diphenhydramine is an oral antihistamine available at drugstores and groceries. Unless a prescription antihistamine was given, you can use this medicine to reduce itching if large areas of the skin are involved.  The medicine may make you sleepy, so be ca · If you are stung by a honeybee, a stinger will remain in your skin. Wasps, yellow jackets, and hornets don’t leave a stinger behind. Move away from the nest area right away.  The stinger of a honeybee releases a substance that will attract other bees to y · Seizure  When to seek medical advice  Call your healthcare provider right away if any of these occur:  · Spreading areas of itching, redness or swelling  · New or worse swelling in the face, eyelids, or  lips  · Dizziness or weakness  Also call your prov

## 2019-09-17 ENCOUNTER — OFFICE VISIT (OUTPATIENT)
Dept: FAMILY MEDICINE CLINIC | Facility: CLINIC | Age: 24
End: 2019-09-17
Payer: COMMERCIAL

## 2019-09-17 VITALS
OXYGEN SATURATION: 98 % | TEMPERATURE: 98 F | BODY MASS INDEX: 21.97 KG/M2 | DIASTOLIC BLOOD PRESSURE: 78 MMHG | WEIGHT: 124 LBS | SYSTOLIC BLOOD PRESSURE: 110 MMHG | HEART RATE: 64 BPM | RESPIRATION RATE: 20 BRPM | HEIGHT: 63 IN

## 2019-09-17 DIAGNOSIS — R10.30 LOWER ABDOMINAL PAIN: Primary | ICD-10-CM

## 2019-09-17 DIAGNOSIS — R30.0 DYSURIA: ICD-10-CM

## 2019-09-17 LAB
APPEARANCE: CLEAR
CONTROL LINE PRESENT WITH A CLEAR BACKGROUND (YES/NO): YES YES/NO
MULTISTIX LOT#: NORMAL NUMERIC
PH, URINE: 6.5 (ref 4.5–8)
SPECIFIC GRAVITY: 1.01 (ref 1–1.03)
URINE-COLOR: YELLOW

## 2019-09-17 PROCEDURE — 81025 URINE PREGNANCY TEST: CPT | Performed by: INTERNAL MEDICINE

## 2019-09-17 PROCEDURE — 87491 CHLMYD TRACH DNA AMP PROBE: CPT | Performed by: INTERNAL MEDICINE

## 2019-09-17 PROCEDURE — 87660 TRICHOMONAS VAGIN DIR PROBE: CPT | Performed by: INTERNAL MEDICINE

## 2019-09-17 PROCEDURE — 87480 CANDIDA DNA DIR PROBE: CPT | Performed by: INTERNAL MEDICINE

## 2019-09-17 PROCEDURE — 87086 URINE CULTURE/COLONY COUNT: CPT | Performed by: INTERNAL MEDICINE

## 2019-09-17 PROCEDURE — 99214 OFFICE O/P EST MOD 30 MIN: CPT | Performed by: INTERNAL MEDICINE

## 2019-09-17 PROCEDURE — 81003 URINALYSIS AUTO W/O SCOPE: CPT | Performed by: INTERNAL MEDICINE

## 2019-09-17 PROCEDURE — 87591 N.GONORRHOEAE DNA AMP PROB: CPT | Performed by: INTERNAL MEDICINE

## 2019-09-17 PROCEDURE — 87510 GARDNER VAG DNA DIR PROBE: CPT | Performed by: INTERNAL MEDICINE

## 2019-09-18 ENCOUNTER — TELEPHONE (OUTPATIENT)
Dept: FAMILY MEDICINE CLINIC | Facility: CLINIC | Age: 24
End: 2019-09-18

## 2019-09-18 DIAGNOSIS — N76.0 BV (BACTERIAL VAGINOSIS): Primary | ICD-10-CM

## 2019-09-18 DIAGNOSIS — B96.89 BV (BACTERIAL VAGINOSIS): Primary | ICD-10-CM

## 2019-09-18 LAB
C TRACH DNA SPEC QL NAA+PROBE: NEGATIVE
N GONORRHOEA DNA SPEC QL NAA+PROBE: NEGATIVE

## 2019-09-18 RX ORDER — FLUCONAZOLE 150 MG/1
TABLET ORAL
Qty: 2 TABLET | Refills: 0 | Status: SHIPPED | OUTPATIENT
Start: 2019-09-18 | End: 2019-11-01 | Stop reason: ALTCHOICE

## 2019-09-18 RX ORDER — METRONIDAZOLE 500 MG/1
500 TABLET ORAL 2 TIMES DAILY
Qty: 14 TABLET | Refills: 0 | Status: SHIPPED | OUTPATIENT
Start: 2019-09-18 | End: 2019-09-25

## 2019-09-19 ENCOUNTER — TELEPHONE (OUTPATIENT)
Dept: FAMILY MEDICINE CLINIC | Facility: CLINIC | Age: 24
End: 2019-09-19

## 2019-09-19 NOTE — TELEPHONE ENCOUNTER
I addressed her labs last night thru [facility] and scripts are at her pharmacy. She told me it was ok to do so.

## 2019-09-19 NOTE — TELEPHONE ENCOUNTER
Left message for patient that results addressed through [address] - prescriptions sent to pharmacy.

## 2019-11-02 RX ORDER — METRONIDAZOLE 500 MG/1
500 TABLET ORAL 2 TIMES DAILY
Qty: 14 TABLET | Refills: 1 | Status: SHIPPED | OUTPATIENT
Start: 2019-11-02 | End: 2019-12-03 | Stop reason: ALTCHOICE

## 2020-01-28 NOTE — PROGRESS NOTES
HPI:   Satnam Rivera is a 25year old female who c/o returning/worsening anxiety.     Current stressors include:    Home with sibling issues, work stress   Symptoms include:   Inability to concentrate, not sleeping well, worrying more, feeling more tense reducing modalities, routine exercise, and a healthy diet. Counseling to continue. Follow up in 1 month(s). Tarsha Bethea was given an opportunity to ask questions and verbalized understanding of care.

## 2020-02-24 NOTE — PROGRESS NOTES
HPI:   Femi Johnson is a 25year old female who presents for follow up of anxiety. She was doing well on buspar- taking it daily or twice a day. But then developed headaches from it.  Routinely seeing a psychologist.  Denies suicidal and homicidal thoug Requested Prescriptions     Signed Prescriptions Disp Refills   • clonazePAM 0.25 MG Oral Tablet Dispersible 13 tablet 0     Sig: Take 1 tablet (0.25 mg total) by mouth daily as needed.

## 2020-03-19 PROBLEM — K59.03 DRUG-INDUCED CONSTIPATION: Status: RESOLVED | Noted: 2018-01-31 | Resolved: 2020-03-19

## 2020-03-19 NOTE — PROGRESS NOTES
Aaron Reeder is a 25year old female. HPI:   Here for follow up anxiety. Doing better on clonazepam than buspar. Feels like it may not be strong enough. No sedation. Denies panic attacks. No harmful thoughts.   Taking trileptal from her Psychiatrist w apparent distress  SKIN: warm & dry  HEENT: atraumatic, normocephalic   NECK: supple,no adenopathy   LUNGS: CTA, easy breathing  CV: normal S1S2, RRR without murmur  PSYCH: good eye contact; pleasant interaction, smiling, good insight; good judgement    AS

## 2020-03-31 NOTE — PROGRESS NOTES
Virtual/Telephone Check-In    Wyoming Doddridge verbally consents to a Virtual/Telephone Check-In service on 03/31/20. Patient understands and accepts financial responsibility for any deductible, co-insurance and/or co-pays associated with this service.     Du pain or pressure; denies racing heart  GI: + intermittent nausea, appetite is less than usual but still \"ok\"; no emesis; no diarrhea; no abd pain  : chance of pregnancy is possible; last menses was 1 1/2 weeks ago, symptoms started 1 week ago  NEURO: +

## 2020-04-17 RX ORDER — CLONAZEPAM 0.5 MG/1
0.5 TABLET ORAL 2 TIMES DAILY PRN
Qty: 15 TABLET | Refills: 1 | Status: SHIPPED | OUTPATIENT
Start: 2020-04-17 | End: 2020-05-14

## 2020-05-14 RX ORDER — CLONAZEPAM 0.5 MG/1
0.5 TABLET ORAL 2 TIMES DAILY PRN
Qty: 15 TABLET | Refills: 1 | Status: SHIPPED | OUTPATIENT
Start: 2020-05-14 | End: 2021-01-03

## 2020-05-14 RX ORDER — OXCARBAZEPINE 300 MG/1
300 TABLET, FILM COATED ORAL 2 TIMES DAILY
Qty: 60 TABLET | Refills: 3 | Status: SHIPPED | OUTPATIENT
Start: 2020-05-14 | End: 2020-06-12

## 2020-05-14 NOTE — TELEPHONE ENCOUNTER
Last office visit: 03/19/2020  Last refill: 04/17/2020 clonazepam, 03/19/2020 oxcarbazepine    Ed Neri, please approve or deny Rx requests below.

## 2020-06-01 RX ORDER — ONDANSETRON 8 MG/1
8 TABLET, ORALLY DISINTEGRATING ORAL EVERY 8 HOURS PRN
Qty: 15 TABLET | Refills: 0 | Status: SHIPPED | OUTPATIENT
Start: 2020-06-01 | End: 2020-07-21

## 2020-06-12 RX ORDER — CLONAZEPAM 0.5 MG/1
TABLET ORAL
Qty: 30 TABLET | Refills: 2 | Status: SHIPPED | OUTPATIENT
Start: 2020-06-12 | End: 2020-07-21

## 2020-06-15 RX ORDER — OXCARBAZEPINE 300 MG/1
300 TABLET, FILM COATED ORAL 2 TIMES DAILY
Qty: 60 TABLET | Refills: 3 | Status: SHIPPED | OUTPATIENT
Start: 2020-06-15 | End: 2020-07-21

## 2020-07-21 RX ORDER — OXCARBAZEPINE 300 MG/1
300 TABLET, FILM COATED ORAL 2 TIMES DAILY
Qty: 60 TABLET | Refills: 3 | Status: SHIPPED | OUTPATIENT
Start: 2020-07-21 | End: 2020-08-24

## 2020-07-21 RX ORDER — ONDANSETRON 8 MG/1
8 TABLET, ORALLY DISINTEGRATING ORAL EVERY 8 HOURS PRN
Qty: 15 TABLET | Refills: 0 | Status: SHIPPED | OUTPATIENT
Start: 2020-07-21 | End: 2020-09-26

## 2020-07-21 RX ORDER — CLONAZEPAM 0.5 MG/1
TABLET ORAL
Qty: 30 TABLET | Refills: 2 | Status: SHIPPED | OUTPATIENT
Start: 2020-07-21 | End: 2020-08-24

## 2020-07-21 NOTE — TELEPHONE ENCOUNTER
Last ov 3/31/2020    Last refill clonazepam 6/12/2020  Last refill oxcarbazepine 6/15/2020  Last refill ondansetron 6/1/2020

## 2020-08-25 RX ORDER — OXCARBAZEPINE 300 MG/1
300 TABLET, FILM COATED ORAL 2 TIMES DAILY
Qty: 60 TABLET | Refills: 0 | Status: SHIPPED | OUTPATIENT
Start: 2020-08-25 | End: 2021-03-17 | Stop reason: ALTCHOICE

## 2020-08-25 RX ORDER — CLONAZEPAM 0.5 MG/1
TABLET ORAL
Qty: 30 TABLET | Refills: 0 | Status: SHIPPED | OUTPATIENT
Start: 2020-08-25 | End: 2020-09-22

## 2020-09-05 ENCOUNTER — APPOINTMENT (OUTPATIENT)
Dept: LAB | Age: 25
End: 2020-09-05
Attending: FAMILY MEDICINE
Payer: COMMERCIAL

## 2020-09-05 ENCOUNTER — TELEMEDICINE (OUTPATIENT)
Dept: FAMILY MEDICINE CLINIC | Facility: CLINIC | Age: 25
End: 2020-09-05

## 2020-09-05 DIAGNOSIS — R68.83 CHILLS: ICD-10-CM

## 2020-09-05 DIAGNOSIS — R52 BODY ACHES: ICD-10-CM

## 2020-09-05 DIAGNOSIS — R51.9 HEADACHE DISORDER: Primary | ICD-10-CM

## 2020-09-05 DIAGNOSIS — R53.83 FATIGUE, UNSPECIFIED TYPE: ICD-10-CM

## 2020-09-05 DIAGNOSIS — R51.9 HEADACHE DISORDER: ICD-10-CM

## 2020-09-05 PROCEDURE — 99213 OFFICE O/P EST LOW 20 MIN: CPT | Performed by: FAMILY MEDICINE

## 2020-09-05 NOTE — PROGRESS NOTES
HPI:    Patient ID: Ambreen Mcginnis is a 25year old female. Headache    This is a new problem. Episode onset: 8/31/2020. The problem has been waxing and waning. The pain quality is not similar to prior headaches. The pain is mild.  Associated symptoms inc Dispersible Take 1 tablet (0.25 mg total) by mouth daily as needed. 13 tablet 0   • ibuprofen 600 MG Oral Tab Take 600 mg by mouth every 6 (six) hours as needed for Pain.        Allergies:  Haemophilus Influen*    RASH  Varicella Virus Vac*    RASH  Vicodin

## 2020-09-07 LAB — SARS-COV-2 RNA RESP QL NAA+PROBE: NOT DETECTED

## 2020-09-22 ENCOUNTER — OFFICE VISIT (OUTPATIENT)
Dept: FAMILY MEDICINE CLINIC | Facility: CLINIC | Age: 25
End: 2020-09-22
Payer: COMMERCIAL

## 2020-09-22 VITALS
DIASTOLIC BLOOD PRESSURE: 60 MMHG | BODY MASS INDEX: 21.62 KG/M2 | OXYGEN SATURATION: 98 % | HEART RATE: 62 BPM | TEMPERATURE: 97 F | WEIGHT: 122 LBS | SYSTOLIC BLOOD PRESSURE: 110 MMHG | RESPIRATION RATE: 20 BRPM | HEIGHT: 63 IN

## 2020-09-22 DIAGNOSIS — G40.A09 NONINTRACTABLE ABSENCE EPILEPSY WITHOUT STATUS EPILEPTICUS (HCC): Primary | ICD-10-CM

## 2020-09-22 DIAGNOSIS — F31.9 BIPOLAR DEPRESSION (HCC): ICD-10-CM

## 2020-09-22 PROCEDURE — 3074F SYST BP LT 130 MM HG: CPT | Performed by: INTERNAL MEDICINE

## 2020-09-22 PROCEDURE — 99213 OFFICE O/P EST LOW 20 MIN: CPT | Performed by: INTERNAL MEDICINE

## 2020-09-22 PROCEDURE — 3078F DIAST BP <80 MM HG: CPT | Performed by: INTERNAL MEDICINE

## 2020-09-22 PROCEDURE — 3008F BODY MASS INDEX DOCD: CPT | Performed by: INTERNAL MEDICINE

## 2020-09-22 RX ORDER — DOXYCYCLINE HYCLATE 100 MG
TABLET ORAL
COMMUNITY
Start: 2020-05-20 | End: 2020-09-22

## 2020-09-22 RX ORDER — METRONIDAZOLE 500 MG/1
500 TABLET ORAL 3 TIMES DAILY
COMMUNITY
End: 2021-02-02 | Stop reason: ALTCHOICE

## 2020-09-22 RX ORDER — IBUPROFEN 600 MG/1
600 TABLET ORAL
COMMUNITY
End: 2021-02-02 | Stop reason: ALTCHOICE

## 2020-09-22 RX ORDER — OXCARBAZEPINE 150 MG/1
TABLET, FILM COATED ORAL
Qty: 90 TABLET | Refills: 1 | Status: SHIPPED | OUTPATIENT
Start: 2020-09-22 | End: 2021-01-03

## 2020-09-23 NOTE — PROGRESS NOTES
HPI:   Ambreen Mcginnis is a 25year old female who presents for follow up of depression & anxiety. She is doing well on medication. Denies side effects. + seeing a psychologist.   Denies suicidal and homicidal thoughts. Doing well on clonazepam prn.   Trudy Phalen 6 weeks, sooner if needed. MEDS & REFILLS FOR THIS VISIT:     Requested Prescriptions     Signed Prescriptions Disp Refills   • OXcarbazepine 150 MG Oral Tab 90 tablet 1     Si tab po nightly x 3 weeks. Then 1 tab po BID.

## 2020-09-26 RX ORDER — ONDANSETRON 8 MG/1
TABLET, ORALLY DISINTEGRATING ORAL
Qty: 15 TABLET | Refills: 0 | Status: SHIPPED | OUTPATIENT
Start: 2020-09-26 | End: 2021-01-03

## 2020-10-29 ENCOUNTER — LAB ENCOUNTER (OUTPATIENT)
Dept: LAB | Age: 25
End: 2020-10-29
Attending: INTERNAL MEDICINE
Payer: COMMERCIAL

## 2020-10-29 DIAGNOSIS — E56.9 VITAMIN DEFICIENCY: ICD-10-CM

## 2020-10-29 DIAGNOSIS — Z79.899 LONG TERM CURRENT USE OF ANTIPSYCHOTIC MEDICATION: ICD-10-CM

## 2020-10-29 DIAGNOSIS — Z00.00 ROUTINE GENERAL MEDICAL EXAMINATION AT A HEALTH CARE FACILITY: ICD-10-CM

## 2020-10-29 PROCEDURE — 82607 VITAMIN B-12: CPT | Performed by: INTERNAL MEDICINE

## 2020-10-29 PROCEDURE — 36415 COLL VENOUS BLD VENIPUNCTURE: CPT | Performed by: INTERNAL MEDICINE

## 2020-10-29 PROCEDURE — 82306 VITAMIN D 25 HYDROXY: CPT | Performed by: INTERNAL MEDICINE

## 2020-10-29 PROCEDURE — 80050 GENERAL HEALTH PANEL: CPT | Performed by: INTERNAL MEDICINE

## 2020-10-29 PROCEDURE — 80061 LIPID PANEL: CPT | Performed by: INTERNAL MEDICINE

## 2020-10-30 PROBLEM — F32.A ANXIETY AND DEPRESSION: Status: ACTIVE | Noted: 2020-10-30

## 2020-10-30 PROBLEM — F41.9 ANXIETY: Status: ACTIVE | Noted: 2020-10-30

## 2020-10-30 PROBLEM — F41.9 ANXIETY AND DEPRESSION: Status: ACTIVE | Noted: 2020-10-30

## 2020-10-30 NOTE — PROGRESS NOTES
Video Visit  Heather Alejandro is a 22year old female. Patient presents with:  Sick Call: med follow up      HPI:   Pt requests a virtual visit due to the Covid pandemic to discuss med follow up for anxiety. Doing well reducing Trileptal to 150mg BID.  Would palpitations; denies lightheadedness  GI: Denies abdominal pain, heartburn, N/V/D  NEURO: Denies headaches  ENDO: denies unintentional wt loss  PSYCH: feels her anxiety/depression is stable right now; sleeping well, appetite is good, no panic attacks, no a this visit as limited physical exam could be performed. Every conscious effort was taken to allow for sufficient and adequate time. This billing was spent on reviewing labs, medications, radiology tests and decision making.  Appropriate medical decision-clifton

## 2020-10-31 DIAGNOSIS — R73.9 HYPERGLYCEMIA: ICD-10-CM

## 2020-10-31 DIAGNOSIS — E55.9 VITAMIN D DEFICIENCY: Primary | ICD-10-CM

## 2020-10-31 RX ORDER — ERGOCALCIFEROL 1.25 MG/1
50000 CAPSULE ORAL WEEKLY
Qty: 4 CAPSULE | Refills: 2 | Status: SHIPPED | OUTPATIENT
Start: 2020-10-31

## 2021-01-04 RX ORDER — CLONAZEPAM 0.5 MG/1
0.5 TABLET ORAL 2 TIMES DAILY PRN
Qty: 15 TABLET | Refills: 1 | Status: SHIPPED | OUTPATIENT
Start: 2021-01-04 | End: 2021-01-06

## 2021-01-04 RX ORDER — OXCARBAZEPINE 150 MG/1
TABLET, FILM COATED ORAL
Qty: 90 TABLET | Refills: 1 | Status: SHIPPED | OUTPATIENT
Start: 2021-01-04 | End: 2021-03-11

## 2021-01-04 RX ORDER — ONDANSETRON 8 MG/1
8 TABLET, ORALLY DISINTEGRATING ORAL EVERY 8 HOURS PRN
Qty: 15 TABLET | Refills: 0 | Status: SHIPPED | OUTPATIENT
Start: 2021-01-04 | End: 2021-03-11

## 2021-01-04 NOTE — TELEPHONE ENCOUNTER
LV 9-22-20    LR: ONDANSETRON 8MG 9-26-20    LR: OXCARBAZEPINE 150MG 9-22-20    LR: CLONAZEPAM 0.5MG 5-14-20

## 2021-01-05 ENCOUNTER — PATIENT MESSAGE (OUTPATIENT)
Dept: FAMILY MEDICINE CLINIC | Facility: CLINIC | Age: 26
End: 2021-01-05

## 2021-01-06 RX ORDER — CLONAZEPAM 0.5 MG/1
0.5 TABLET ORAL 2 TIMES DAILY PRN
Qty: 30 TABLET | Refills: 0 | Status: SHIPPED | OUTPATIENT
Start: 2021-01-06 | End: 2021-03-11

## 2021-01-06 NOTE — TELEPHONE ENCOUNTER
From: Denver Risser  To: Sherel Boeck, NP  Sent: 1/5/2021 4:07 PM CST  Subject: Prescription Question    Hi there,    I noticed my Rx for Clonazapam this month was only 15 pills, but usually we've been doing 30.  Is there any way I can get this العلي

## 2021-02-10 ENCOUNTER — PATIENT MESSAGE (OUTPATIENT)
Dept: FAMILY MEDICINE CLINIC | Facility: CLINIC | Age: 26
End: 2021-02-10

## 2021-02-10 ENCOUNTER — TELEPHONE (OUTPATIENT)
Dept: FAMILY MEDICINE CLINIC | Facility: CLINIC | Age: 26
End: 2021-02-10

## 2021-02-10 NOTE — TELEPHONE ENCOUNTER
Has been waiting 2 weeks for test results. She is at work.   Please send her a [email or web address] message or leave her a voice mail message

## 2021-03-11 RX ORDER — CLONAZEPAM 0.5 MG/1
0.5 TABLET ORAL 2 TIMES DAILY PRN
Qty: 30 TABLET | Refills: 0 | Status: SHIPPED | OUTPATIENT
Start: 2021-03-11 | End: 2021-04-12

## 2021-03-11 RX ORDER — OXCARBAZEPINE 150 MG/1
TABLET, FILM COATED ORAL
Qty: 60 TABLET | Refills: 0 | Status: SHIPPED | OUTPATIENT
Start: 2021-03-11 | End: 2021-04-12

## 2021-03-11 RX ORDER — ONDANSETRON 8 MG/1
8 TABLET, ORALLY DISINTEGRATING ORAL EVERY 8 HOURS PRN
Qty: 15 TABLET | Refills: 0 | Status: SHIPPED | OUTPATIENT
Start: 2021-03-11 | End: 2021-04-12

## 2021-04-12 ENCOUNTER — TELEMEDICINE (OUTPATIENT)
Dept: FAMILY MEDICINE CLINIC | Facility: CLINIC | Age: 26
End: 2021-04-12

## 2021-04-12 ENCOUNTER — LAB ENCOUNTER (OUTPATIENT)
Dept: LAB | Age: 26
End: 2021-04-12
Attending: INTERNAL MEDICINE
Payer: COMMERCIAL

## 2021-04-12 DIAGNOSIS — B96.89 ACUTE BACTERIAL SINUSITIS: ICD-10-CM

## 2021-04-12 DIAGNOSIS — J01.90 ACUTE BACTERIAL SINUSITIS: Primary | ICD-10-CM

## 2021-04-12 DIAGNOSIS — J01.90 ACUTE BACTERIAL SINUSITIS: ICD-10-CM

## 2021-04-12 DIAGNOSIS — F41.9 ANXIETY AND DEPRESSION: ICD-10-CM

## 2021-04-12 DIAGNOSIS — F32.A ANXIETY AND DEPRESSION: ICD-10-CM

## 2021-04-12 DIAGNOSIS — B96.89 ACUTE BACTERIAL SINUSITIS: Primary | ICD-10-CM

## 2021-04-12 PROCEDURE — 99214 OFFICE O/P EST MOD 30 MIN: CPT | Performed by: INTERNAL MEDICINE

## 2021-04-12 RX ORDER — CLONAZEPAM 0.5 MG/1
0.5 TABLET ORAL 2 TIMES DAILY PRN
Qty: 90 TABLET | Refills: 0 | Status: SHIPPED | OUTPATIENT
Start: 2021-04-12

## 2021-04-12 RX ORDER — AMOXICILLIN AND CLAVULANATE POTASSIUM 875; 125 MG/1; MG/1
1 TABLET, FILM COATED ORAL 2 TIMES DAILY
Qty: 20 TABLET | Refills: 0 | Status: SHIPPED | OUTPATIENT
Start: 2021-04-12 | End: 2021-04-22

## 2021-04-12 RX ORDER — OXCARBAZEPINE 150 MG/1
TABLET, FILM COATED ORAL
Qty: 180 TABLET | Refills: 0 | Status: SHIPPED | OUTPATIENT
Start: 2021-04-12

## 2021-04-12 RX ORDER — ONDANSETRON 8 MG/1
8 TABLET, ORALLY DISINTEGRATING ORAL EVERY 8 HOURS PRN
Qty: 30 TABLET | Refills: 0 | Status: SHIPPED | OUTPATIENT
Start: 2021-04-12

## 2021-04-12 NOTE — PROGRESS NOTES
Video Visit  Luis Magdaleno is a 22year old female. No chief complaint on file. Duration of time spent on visit:  min    HPI:   Pt requests a virtual visit due to the Covid pandemic to discuss sickness. Started HA on Wed. Thursday woke with a ST but Denies abdominal pain, denies N/V/D  MS: Denies back, neck or joint pain  NEURO: headaches are \"bandlike\" around her head  ALLERGY/ASTHMA: Denies asthma and environmental allergies  PSYCH: + anxiety and depression that feels controlled on meds, sleeping are ordered as detailed in the plan of care above.

## 2021-04-19 ENCOUNTER — OFFICE VISIT (OUTPATIENT)
Dept: FAMILY MEDICINE CLINIC | Facility: CLINIC | Age: 26
End: 2021-04-19
Payer: COMMERCIAL

## 2021-04-19 VITALS
RESPIRATION RATE: 18 BRPM | DIASTOLIC BLOOD PRESSURE: 70 MMHG | TEMPERATURE: 98 F | BODY MASS INDEX: 22.68 KG/M2 | HEART RATE: 66 BPM | HEIGHT: 63 IN | WEIGHT: 128 LBS | SYSTOLIC BLOOD PRESSURE: 104 MMHG | OXYGEN SATURATION: 99 %

## 2021-04-19 DIAGNOSIS — H92.03 EAR PAIN, BILATERAL: Primary | ICD-10-CM

## 2021-04-19 DIAGNOSIS — J02.9 SORE THROAT: ICD-10-CM

## 2021-04-19 PROCEDURE — 3078F DIAST BP <80 MM HG: CPT | Performed by: PHYSICIAN ASSISTANT

## 2021-04-19 PROCEDURE — 99213 OFFICE O/P EST LOW 20 MIN: CPT | Performed by: PHYSICIAN ASSISTANT

## 2021-04-19 PROCEDURE — 3074F SYST BP LT 130 MM HG: CPT | Performed by: PHYSICIAN ASSISTANT

## 2021-04-19 PROCEDURE — 3008F BODY MASS INDEX DOCD: CPT | Performed by: PHYSICIAN ASSISTANT

## 2021-04-19 NOTE — PROGRESS NOTES
CHIEF COMPLAINT:   Patient presents with:  Ear Pain      HPI:   Juanito Rogers is a 22year old female who presents to clinic today with complaints of bilateral ear pain and sore throat for 2 days.        Associated symptoms:  Patient denies decreased hear rashes  HEENT: See HPI  LUNGS: No cough, shortness of breath, or wheezing. CARDIOVASCULAR: No chest pain, palpitations  GI: No N/V/C/D.   NEURO: denies headaches or dizziness    EXAM:   /70   Pulse 66   Temp 97.5 °F (36.4 °C) (Skin)   Resp 18   Ht 5' PCP  5. If worsening symptoms seek treatment          Patient voiced understand and is in agreement with treatment plan.

## 2021-04-19 NOTE — PATIENT INSTRUCTIONS
Patient Declined AVS    Verbal Instructions given      1. Flonase  2. Claritin  3. Continue Flonase  4. Follow up with PCP  5.  If worsening symptoms seek treatment

## 2022-07-16 NOTE — PATIENT INSTRUCTIONS
-Cool mist humidifier  -Tea with honey  -Sudafed  -Flonase  -Neti pot  -Push fluids      Sinusitis (No Antibiotics)    The sinuses are air-filled spaces within the bones of the face.  They connect to the inside of the nose. Sinusitis is an inflammation of t unless another pain medicine was prescribed to you. If you have chronic liver or kidney disease or ever had a stomach ulcer, talk with your healthcare provider before using these medicines.  (Aspirin should never be taken by anyone under age 25 who is ill w Regular

## 2025-01-24 NOTE — PATIENT INSTRUCTIONS
Viral Upper Respiratory Illness (Adult)  You have a viral upper respiratory illness (URI), which is another term for the common cold. This illness is contagious during the first few days. It is spread through the air by coughing and sneezing.  It may also Call your healthcare provider right away if any of these occur:  · Cough with lots of colored sputum (mucus)  · Severe headache; face, neck, or ear pain  · Difficulty swallowing due to throat pain  · Fever of 100.4°F (38°C)  Call 911, or get immediate medi A medical evaluation can help find the cause of your sore throat. It can also help your healthcare provider choose the best treatment for you. The evaluation may include a health history, physical exam, and diagnostic tests.   Health history  Your healthcar · Gargle with warm saltwater (1 teaspoon of salt to 8 ounces of warm water). · Use a humidifier to keep air moist and relieve throat dryness. · Try over-the-counter pain relievers such as acetaminophen or ibuprofen.  Use as directed, and don’t exceed the · A skin rash, hives, or wheezing develops. Any of these could signal an allergic reaction to antibiotics. · Symptoms don’t improve within a week. · Symptoms don’t improve within 2 to 3 days of starting antibiotics.    Date Last Reviewed: 10/1/2016  © 200 We've got you covered from head to toe    Our specialty programs:    [facility]  [phone]  [email or web address]    Kristian (Scheduling) team hours of operation:  Monday through Thursday, 7am to 8:30pm  Friday, 7am to 7pm  Saturday, 8am to 4pm    Back Pain  WHAT YOU NEED TO KNOW:  Back pain is common. It can be caused by many conditions, such as arthritis or the breakdown of spinal discs. Your risk for back pain is increased by injuries, lack of activity, or repeated bending and twisting. You may feel sore or stiff on one or both sides of your back. The pain may spread to your buttocks or thighs.  DISCHARGE INSTRUCTIONS:  Return to the emergency department if:   •You have pain, numbness, or weakness in one or both legs.  •Your pain becomes so severe that you cannot walk.  •You cannot control your urine or bowel movements.  •You have severe back pain with chest pain.  •You have severe back pain, nausea, and vomiting.  •You have severe back pain that spreads to your side or genital area.  Contact your healthcare provider if:   •You have back pain that does not get better with rest and pain medicine.  •You have a fever.  •You have pain that worsens when you are on your back or when you rest.  •You have pain that worsens when you cough or sneeze.  •You lose weight without trying.  •You have questions or concerns about your condition or care.  Medicines:   •NSAIDs help decrease swelling and pain. This medicine is available with or without a doctor's order. NSAIDs can cause stomach bleeding or kidney problems in certain people. If you take blood thinner medicine, always ask your healthcare provider if NSAIDs are safe for you. Always read the medicine label and follow directions.  •Acetaminophen decreases pain and fever. It is available without a doctor's order. Ask how much to take and how often to take it. Follow directions. Read the labels of all other medicines you are using to see if they also contain acetaminophen, or ask your doctor or pharmacist. Acetaminophen can cause liver damage if not taken correctly. Do not use more than 4 grams (4,000 milligrams) total of acetaminophen in one day.   •Muscle relaxers help decrease muscle spasms and back pain.  •Prescription pain medicine may be given. Ask your healthcare provider how to take this medicine safely. Some prescription pain medicines contain acetaminophen. Do not take other medicines that contain acetaminophen without talking to your healthcare provider. Too much acetaminophen may cause liver damage. Prescription pain medicine may cause constipation. Ask your healthcare provider how to prevent or treat constipation.   •Take your medicine as directed. Contact your healthcare provider if you think your medicine is not helping or if you have side effects. Tell him or her if you are allergic to any medicine. Keep a list of the medicines, vitamins, and herbs you take. Include the amounts, and when and why you take them. Bring the list or the pill bottles to follow-up visits. Carry your medicine list with you in case of an emergency.  How to manage your back pain:   •Apply ice on your back for 15 to 20 minutes every hour or as directed. Use an ice pack, or put crushed ice in a plastic bag. Cover it with a towel before you apply it to your skin. Ice helps prevent tissue damage and decreases pain.  •Apply heat on your back for 20 to 30 minutes every 2 hours for as many days as directed. Heat helps decrease pain and muscle spasms.  •Stay active as much as you can without causing more pain. Bed rest could make your back pain worse. Avoid heavy lifting until your pain is gone.  •Go to physical therapy as directed. A physical therapist can teach you exercises to help improve movement and strength, and to decrease pain.  Follow up with your healthcare provider in 2 weeks, or as directed: Write down your questions so you remember to ask them during your visits.    Dolor de espalda  LO QUE NECESITA SABER:  El dolor de espalda es común. Puede ser causado por ruslan gran cantidad de afecciones, dae la artritis o por el deterioro de los discos de la columna vertebral. El riesgo del dolor de espalda aumenta al sufrir lesiones, por falta de actividad física, o inclinarse hacia adelante o girar de un lado a otro de forma repetitiva. Usted puede estar adolorido o sentir rigidez en teddy o ambos lados de la espalda. El dolor se puede propagar a estefanía glúteos o muslos.  INSTRUCCIONES SOBRE EL BUTCH HOSPITALARIA:  Regrese a la joya de emergencias si:  •Usted tiene dolor, entumecimiento o debilidad en ruslan o en ambas piernas.  •El dolor se vuelve tan intenso que lo incapacita para caminar.  •Usted no puede controlar wen orina ni estefanía deposiciones intestinales.  •Usted tiene dolor de espalda intenso con dolor en el pecho.  •Usted tiene dolor de espalda intenso, náuseas y vómito.  •Usted tiene un dolor de espalda intenso que se propaga a un costado o al área genital.  Comuníquese con wen médico si:  •Usted tiene dolor de espalda que no mejora con el reposo, ni con el medicamento para el dolor.  •Tiene fiebre.  •Usted tiene un dolor que empeora cuando está acostado boca arriba o al descansar.  •Usted tiene dolor que empeora cuando tose o estornuda.  •Usted pierde peso sin proponérselo.  •Usted tiene preguntas o inquietudes acerca de wen condición o cuidado.  Medicamentos:  •Los MICHELE,ayudan a disminuir la inflamación y el dolor. Lizeth medicamento está disponible con o sin ruslan receta médica. Los MICHELE pueden causar sangrado estomacal o problemas renales en ciertas personas. Si usted aysha un medicamento anticoagulante, siempre pregúntele a wen médico si los MICHELE son seguros para usted. Siempre jos la etiqueta de lizeth medicamento y siga las instrucciones.  •Acetaminofénalivia el dolor y baja la fiebre. Está disponible sin receta médica. Pregunte la cantidad y la frecuencia con que debe tomarlos. Siga las indicaciones. Jos las etiquetas de todos los demás medicamentos que esté usando para saber si también contienen acetaminofén, o pregunte a wen médico o farmacéutico. El acetaminofén puede causar daño en el hígado cuando no se aysha de forma correcta. No use más de 4 gramos (4000 miligramos) en total de acetaminofeno en un día.  •Relajantes muscularesayudan a disminuir los espasmos musculares y el dolor de espalda.  •Puede administrarsepodrían administrarse. Pregunte al médico cómo debe rayne lizeth medicamento de forma tejada. Algunos medicamentos recetados para el dolor contienen acetaminofén. No tome otros medicamentos que contengan acetaminofén sin consultarlo con wen médico. Demasiado acetaminofeno puede causar daño al hígado. Los medicamentos recetados para el dolor podrían causar estreñimiento. Pregunte a wen médico dae prevenir o tratar estreñimiento.  •[place] estefanía medicamentos dae se le haya indicado.Consulte con wen médico si usted lucius que wen medicamento no le está ayudando o si presenta efectos secundarios. Infórmele si es alérgico a cualquier medicamento. Mantenga ruslan lista actualizada de los medicamentos, las vitaminas y los productos herbales que aysha. Incluya los siguientes datos de los medicamentos: cantidad, frecuencia y motivo de administración. Traiga con usted la lista o los envases de las píldoras a estefanía citas de seguimiento. Lleve la lista de los medicamentos con usted en perez de ruslan emergencia.  La forma de controlar wen dolor de espalda:  •Aplique hieloen la espalda de 15 a 20 minutos cada hora o dae se le indique. Use ruslan compresa de hielo o ponga hielo triturado en ruslan bolsa de plástico. Cúbralo con ruslan toalla antes de aplicarlo sobre wen piel. El hielo disminuye el dolor y ayuda a evitar el daño en los tejidos.  •La aplicación de caloren la espalda de 20 a 30 minutos cada 2 horas por los días que le indiquen. El calor ayuda a disminuir el dolor y los espasmos musculares.  •Manténgase activolo más que pueda sin causar más dolor. El reposo en cama puede empeorar wen dolor de espalda. Evite levantar objetos hasta que ya no tenga dolor.  •Vaya a terapia física según indicaciones.Un fisioterapeuta puede enseñarle ejercicios que contribuyan a mejorar wen movimiento y fuerza, y a aliviar el dolor.  Acuda en 2 semanas a wen john de control con wen médico, o según le indicaron:Anote estefanía preguntas para que se acuerde de hacerlas valerio estefanía visitas.

## (undated) NOTE — LETTER
Date: 12/13/2018    Patient Name: Regena Duverney          To Whom it may concern: The above patient was seen at the [facility] for treatment of a medical condition.     This patient should be excused from attending work 12/13/18 and 12/14/18

## (undated) NOTE — LETTER
03/04/19    3/4/2019    Rabia Terra        To Whom It May Concern:      Rabia Ellison was seen and treated in my office today. Please excuse them from work/school on 3/5/19. Feel free to call me with any questions at [phone].         Sincerely

## (undated) NOTE — MR AVS SNAPSHOT
[address]  [address] [id number] [phone]               Thank you for choosing us for your health care visit with Auston Landau, NP.   We are glad to serve you and happy to provide you with [place] [address] [address]  [facility] [address]    Mon,Wed, Thurs  7am-6:30pm  Tues and Fri        7am-3pm        Schedule lab appointments online at [facility]               Allergies as of May 2 If you've recently had a stay at the Hospital you can access your discharge instructions in [employer] by going to Visits < Admission Summaries.  If you've been to the Emergency Department or your doctor's office, you can view your past visit information in My

## (undated) NOTE — Clinical Note
Date: 1/26/2017    Patient Name: Heather Alejandro          To Whom it may concern: This letter has been written at the patient's request. The above patient was seen at the [facility] for treatment of a medical condition.     This patient shoul

## (undated) NOTE — ED AVS SNAPSHOT
Luis Magdaleno   MRN: [id number]    Department:  [facility] Emergency Department   Date of Visit:  6/17/2019           Disclosure     Insurance plans vary and the physician(s) referred by the ER may not be covered by your plan.  Please contact your tell this physician (or your personal doctor if your instructions are to return to your personal doctor) about any new or lasting problems. The primary care or specialist physician will see patients referred from the [facility] Emergency Department.  Andree Gomez

## (undated) NOTE — MR AVS SNAPSHOT
[address]  [address] [id number] [phone]               Thank you for choosing us for your health care visit with Parisa Pelletier.   We are glad to serve you and happy to provide you with this - [address], [phone], [address] [facility] [address] [address]     Phone:  [phone]    - methylPREDNISolone 4 MG Tbpk            Today's Orders     ENT - INTERNAL    Complete by:  As directed    Assoc Dx:   Tonsillitis office, you can view your past visit information in [employer] by going to Visits < Visit Summaries. [employer] questions? Call [phone] for help. [employer] is NOT to be used for urgent needs. For medical emergencies, dial 911.            Visit [facility]

## (undated) NOTE — MR AVS SNAPSHOT
After Visit Summary   9/17/2019    Fly Murphy    MRN: [id number]           Visit Information     Date & Time  9/17/2019 12:30 PM Provider  Juan Hernandez NP Department  [address] [place].  Phone  [phone] [facility] now offers Video Visits through [address] for adult and pediatric patients. Video Visits are available Monday - Friday for many common conditions such as allergies, colds, cough, fever, rash, sore throat, headache and pink eye.   The cost for a Video Vi at a hospital emergency room. Average cost  $2,300*   *Cost varies based on your insurance coverage  For more information about hours, locations or appointment options available at [facility],  visit: [email or web address] or call [email or web address]. (1

## (undated) NOTE — MR AVS SNAPSHOT
[facility] [address] [facility]  [address] [employer]  [place] [id number] [phone]               Thank you for choosing us for your health care visit with Rodrick Xie PA-C. We are glad to serve you and happy to provide you with this summary of your visit.   Nanette given to anyone under 25years of age who is ill with a viral infection or fever. It may cause severe liver or brain damage. · Your appetite may be poor, so a light diet is fine.  Avoid dehydration by drinking 6 to 8 glasses of fluids per day (water, soft The tonsils are on the sides of the throat near the base of the tongue. They collect viruses and bacteria and help fight infection. The throat (pharynx) is the passage for air. Mucus from the nasal cavity also moves down the passage.   An inflamed throat  T Treatment depends on many factors. What is the likely cause? Is the problem recent? Does it keep coming back? In many cases, the best thing to do is to treat the symptoms, rest, and let the problem heal itself.  Antibiotics may help clear up some bacterial outpatient (same-day) surgery. Your healthcare provider may advise removing the tonsils in cases of:  · Several severe bouts of tonsillitis in a year.  “Severe” episodes include those that lead to missed days of school or work, or that need to be treated wi TOPAMAX 50 MG Tabs   Generic drug:  topiramate   2 tab                Where to Get Your Medications      These medications were sent to [address], [address], [phone], [phone]  [address] [place] [address]

## (undated) NOTE — MR AVS SNAPSHOT
[address]  [address] [address] [phone]               Thank you for choosing us for your health care visit with Aaron Mahmood DO.   We are glad to serve you and happy to provide you with this kim - Amoxicillin-Pot Clavulanate 875-125 MG Tabs            Today's Orders     Rapid Strep    Complete by:  As directed    Assoc Dx:   Sore throat [J02.9]                 Results of Recent Testing     STREP A ASSAY W/OPTIC      Component Value Standard Range

## (undated) NOTE — MR AVS SNAPSHOT
[address]  [address] [address] [phone]               Thank you for choosing us for your health care visit with Yolanda Varghese NP.   We are glad to serve you and happy to provide you with Your physician has referred you to a specialist.  Your physician or the clinic staff will provide you with the phone number you should call to schedule your appointment.      If you are confident that your benefit plan will not require a referral or authori discharge instructions in [employer] by going to Visits < Admission Summaries. If you've been to the Emergency Department or your doctor's office, you can view your past visit information in [employer] by going to Visits < Visit Summaries. "[employer] questions?

## (undated) NOTE — Clinical Note
Date: 1/23/2017    Patient Name: Yuly Carpio          To Whom it may concern: This letter has been written at the patient's request. The above patient was seen at the [facility] for treatment of a medical condition.     This patient shoul

## (undated) NOTE — LETTER
04/12/21      To Whom It May Concern:      Please excuse Rachael Blackwood from work 4/12-4/13. She has been seen and treated today for her illness.       Feel free to call with any questions,            [address] [facility] Nurse Practitioner  Alice San